# Patient Record
Sex: FEMALE | Race: WHITE | HISPANIC OR LATINO | Employment: UNEMPLOYED | URBAN - METROPOLITAN AREA
[De-identification: names, ages, dates, MRNs, and addresses within clinical notes are randomized per-mention and may not be internally consistent; named-entity substitution may affect disease eponyms.]

---

## 2017-12-10 ENCOUNTER — HOSPITAL ENCOUNTER (EMERGENCY)
Facility: HOSPITAL | Age: 22
Discharge: HOME | End: 2017-12-11
Attending: EMERGENCY MEDICINE
Payer: MEDICAID

## 2017-12-10 DIAGNOSIS — O20.9 VAGINAL BLEEDING IN PREGNANCY, FIRST TRIMESTER: ICD-10-CM

## 2017-12-10 DIAGNOSIS — O20.0 THREATENED ABORTION: Primary | ICD-10-CM

## 2017-12-10 LAB
B-HCG UR QL: POSITIVE
CTP QC/QA: YES

## 2017-12-10 PROCEDURE — 99284 EMERGENCY DEPT VISIT MOD MDM: CPT | Mod: 25

## 2017-12-10 PROCEDURE — 81000 URINALYSIS NONAUTO W/SCOPE: CPT

## 2017-12-10 PROCEDURE — 81025 URINE PREGNANCY TEST: CPT | Performed by: EMERGENCY MEDICINE

## 2017-12-11 VITALS
BODY MASS INDEX: 24.54 KG/M2 | HEART RATE: 77 BPM | DIASTOLIC BLOOD PRESSURE: 67 MMHG | HEIGHT: 60 IN | SYSTOLIC BLOOD PRESSURE: 104 MMHG | TEMPERATURE: 98 F | WEIGHT: 125 LBS | RESPIRATION RATE: 18 BRPM | OXYGEN SATURATION: 97 %

## 2017-12-11 LAB
ABO + RH BLD: NORMAL
ALBUMIN SERPL BCP-MCNC: 3.9 G/DL
ALP SERPL-CCNC: 66 U/L
ALT SERPL W/O P-5'-P-CCNC: 24 U/L
ANION GAP SERPL CALC-SCNC: 10 MMOL/L
AST SERPL-CCNC: 25 U/L
BACTERIA #/AREA URNS HPF: ABNORMAL /HPF
BACTERIA GENITAL QL WET PREP: ABNORMAL
BASOPHILS # BLD AUTO: 0.03 K/UL
BASOPHILS NFR BLD: 0.3 %
BILIRUB SERPL-MCNC: 0.7 MG/DL
BILIRUB UR QL STRIP: NEGATIVE
BUN SERPL-MCNC: 9 MG/DL
C TRACH DNA SPEC QL NAA+PROBE: NOT DETECTED
CALCIUM SERPL-MCNC: 9.3 MG/DL
CHLORIDE SERPL-SCNC: 104 MMOL/L
CLARITY UR: ABNORMAL
CLUE CELLS VAG QL WET PREP: ABNORMAL
CO2 SERPL-SCNC: 21 MMOL/L
COLOR UR: YELLOW
CREAT SERPL-MCNC: 0.7 MG/DL
DIFFERENTIAL METHOD: ABNORMAL
EOSINOPHIL # BLD AUTO: 0.3 K/UL
EOSINOPHIL NFR BLD: 2.7 %
ERYTHROCYTE [DISTWIDTH] IN BLOOD BY AUTOMATED COUNT: 11.9 %
EST. GFR  (AFRICAN AMERICAN): >60 ML/MIN/1.73 M^2
EST. GFR  (NON AFRICAN AMERICAN): >60 ML/MIN/1.73 M^2
FILAMENT FUNGI VAG WET PREP-#/AREA: ABNORMAL
GLUCOSE SERPL-MCNC: 88 MG/DL
GLUCOSE UR QL STRIP: NEGATIVE
HCG INTACT+B SERPL-ACNC: NORMAL MIU/ML
HCT VFR BLD AUTO: 36.9 %
HGB BLD-MCNC: 12.8 G/DL
HGB UR QL STRIP: ABNORMAL
KETONES UR QL STRIP: NEGATIVE
LEUKOCYTE ESTERASE UR QL STRIP: NEGATIVE
LYMPHOCYTES # BLD AUTO: 4.2 K/UL
LYMPHOCYTES NFR BLD: 40.6 %
MCH RBC QN AUTO: 30.9 PG
MCHC RBC AUTO-ENTMCNC: 34.7 G/DL
MCV RBC AUTO: 89 FL
MICROSCOPIC COMMENT: ABNORMAL
MONOCYTES # BLD AUTO: 0.6 K/UL
MONOCYTES NFR BLD: 6.1 %
N GONORRHOEA DNA SPEC QL NAA+PROBE: NOT DETECTED
NEUTROPHILS # BLD AUTO: 5.1 K/UL
NEUTROPHILS NFR BLD: 50 %
NITRITE UR QL STRIP: NEGATIVE
PH UR STRIP: 6 [PH] (ref 5–8)
PLATELET # BLD AUTO: 271 K/UL
PMV BLD AUTO: 10.4 FL
POTASSIUM SERPL-SCNC: 3.8 MMOL/L
PROT SERPL-MCNC: 7.9 G/DL
PROT UR QL STRIP: NEGATIVE
RBC # BLD AUTO: 4.14 M/UL
RBC #/AREA URNS HPF: >100 /HPF (ref 0–4)
SODIUM SERPL-SCNC: 135 MMOL/L
SP GR UR STRIP: 1.02 (ref 1–1.03)
SPECIMEN SOURCE: ABNORMAL
SQUAMOUS #/AREA URNS HPF: 5 /HPF
T VAGINALIS GENITAL QL WET PREP: ABNORMAL
URN SPEC COLLECT METH UR: ABNORMAL
UROBILINOGEN UR STRIP-ACNC: NEGATIVE EU/DL
WBC # BLD AUTO: 10.25 K/UL
WBC #/AREA URNS HPF: 2 /HPF (ref 0–5)
WBC #/AREA VAG WET PREP: ABNORMAL
YEAST GENITAL QL WET PREP: ABNORMAL

## 2017-12-11 PROCEDURE — 87210 SMEAR WET MOUNT SALINE/INK: CPT

## 2017-12-11 PROCEDURE — 86901 BLOOD TYPING SEROLOGIC RH(D): CPT

## 2017-12-11 PROCEDURE — 84702 CHORIONIC GONADOTROPIN TEST: CPT

## 2017-12-11 PROCEDURE — 80053 COMPREHEN METABOLIC PANEL: CPT

## 2017-12-11 PROCEDURE — 87591 N.GONORRHOEAE DNA AMP PROB: CPT

## 2017-12-11 PROCEDURE — 86900 BLOOD TYPING SEROLOGIC ABO: CPT

## 2017-12-11 PROCEDURE — 85025 COMPLETE CBC W/AUTO DIFF WBC: CPT

## 2017-12-11 NOTE — ED PROVIDER NOTES
"Encounter Date: 12/10/2017    SCRIBE #1 NOTE: I, Jerome Olivera, am scribing for, and in the presence of,  Peter Fernandez NP. I have scribed the following portions of the note - Other sections scribed: HPI/ROS.       History     Chief Complaint   Patient presents with    Female  Problem     pregnant with lower abd pain x 2 days, painful urination, "slimy" discharge and vag bleeding (dark red x 3 pads) which started today; denies fever; LMP 17; 11 weeks 4 days per wheel; GIUSEPPE 18; hasn't seen a doctor for the pregnancy;      CC: Female  Problem    HPI: This 22 y.o. Female who is 12 weeks pregnant with no pertinent medical history presents to the ED for an evaluation of acute onset, moderate (4/10) vaginal bleeding for the past 2 days. Patient also reports lower back pain that radiates to suprapubic region of abdomen for the past 3 days. She has not seen an OB-GYN for pregnancy yet. No alleviating or exacerbating factors. No prior tx. Otherwise, patient denies fever, chills, chest pain, SOB, cough, and N/V/D.      The history is provided by the patient. A  was used.     Review of patient's allergies indicates:  No Known Allergies  History reviewed. No pertinent past medical history.  Past Surgical History:   Procedure Laterality Date    CYST REMOVAL      forehead     History reviewed. No pertinent family history.  Social History   Substance Use Topics    Smoking status: Never Smoker    Smokeless tobacco: Never Used    Alcohol use No     Review of Systems   Constitutional: Negative for chills and fever.   HENT: Negative for congestion, ear pain, rhinorrhea and sore throat.    Eyes: Negative for pain and visual disturbance.   Respiratory: Negative for cough and shortness of breath.    Cardiovascular: Negative for chest pain.   Gastrointestinal: Positive for abdominal pain (suprapubic). Negative for diarrhea, nausea and vomiting.   Genitourinary: Positive for vaginal bleeding. " Negative for dysuria.   Musculoskeletal: Positive for back pain. Negative for neck pain.   Skin: Negative for rash.   Neurological: Negative for headaches.       Physical Exam     Initial Vitals [12/10/17 2213]   BP Pulse Resp Temp SpO2   125/70 86 14 98.7 °F (37.1 °C) 99 %      MAP       88.33         Physical Exam    Nursing note and vitals reviewed.  Constitutional: She appears well-developed and well-nourished. She is not diaphoretic. No distress.   HENT:   Head: Normocephalic and atraumatic.   Right Ear: External ear normal.   Left Ear: External ear normal.   Nose: Nose normal.   Eyes: Conjunctivae and EOM are normal. Pupils are equal, round, and reactive to light. Right eye exhibits no discharge. Left eye exhibits no discharge. No scleral icterus.   Neck: Normal range of motion. Neck supple. No thyromegaly present. No tracheal deviation present. No JVD present.   Cardiovascular: Normal rate, regular rhythm, normal heart sounds and intact distal pulses. Exam reveals no gallop and no friction rub.    No murmur heard.  Pulmonary/Chest: Breath sounds normal. No stridor. No respiratory distress. She has no wheezes. She has no rhonchi. She has no rales.   Abdominal: Soft. Bowel sounds are normal. She exhibits no distension and no mass. There is no tenderness. There is no rebound and no guarding.   Genitourinary: Pelvic exam was performed with patient supine. Uterus is enlarged (gravid). There is bleeding in the vagina.   Genitourinary Comments: There is blood in the vaginal vault. No tissue, proximal of conception, or foreign bodies. Cervical os is closed. The uterus is gravid.   Musculoskeletal: Normal range of motion. She exhibits no edema or tenderness.   Lymphadenopathy:     She has no cervical adenopathy.   Neurological: She is alert and oriented to person, place, and time. She has normal strength and normal reflexes. She displays normal reflexes. No cranial nerve deficit or sensory deficit.   Skin: Skin is  warm and dry. No rash and no abscess noted. No erythema. No pallor.   Psychiatric: She has a normal mood and affect. Her behavior is normal. Judgment and thought content normal.         ED Course   Procedures  Labs Reviewed   URINALYSIS - Abnormal; Notable for the following:        Result Value    Appearance, UA Hazy (*)     Occult Blood UA 3+ (*)     All other components within normal limits   CBC W/ AUTO DIFFERENTIAL - Abnormal; Notable for the following:     Hematocrit 36.9 (*)     All other components within normal limits   COMPREHENSIVE METABOLIC PANEL - Abnormal; Notable for the following:     Sodium 135 (*)     CO2 21 (*)     All other components within normal limits   VAGINAL SCREEN - Abnormal; Notable for the following:     WBC - Vaginal Screen Rare (*)     Bacteria - Vaginal Screen Occasional (*)     All other components within normal limits   URINALYSIS MICROSCOPIC - Abnormal; Notable for the following:     RBC, UA >100 (*)     Bacteria, UA Few (*)     All other components within normal limits   POCT URINE PREGNANCY - Abnormal; Notable for the following:     POC Preg Test, Ur Positive (*)     All other components within normal limits   C. TRACHOMATIS/N. GONORRHOEAE BY AMP DNA   HCG, QUANTITATIVE, PREGNANCY   GROUP & RH             Medical Decision Making:   ED Management:  22-year-old female presenting for evaluation of vaginal bleeding and suprapubic pain that began 2 days ago and has been continuous. Patient states she is 3 months pregnant. LMP 9/20/17. Patient is 11 weeks and 4 days by dates. Patient has had no prenatal care and no ultrasound confirmation of IUP. UPT is positive. Labs are unremarkable. Blood type is A+. HCG is 10,505. Ultrasound shows pregnancy of unknown viability. There is intrauterine gestational sac with possible fetal pole course find a 6 weeks and 2 days gestation. This does not correspond to the patient's dates. No fetal heart beat is detected. Findings could represent  spontaneous  or incorrect date of conception. Instructed patient to follow-up with OB for serial hCGs and repeat ultrasound. ED return precautions given. Patient verbalized understanding of diagnosis and discharge instructions.  Other:   I have discussed this case with another health care provider.       <> Summary of the Discussion: Case discussed with my attending physician Steven Marie M.D. who agreed with the assessment and plan.            Scribe Attestation:   Scribe #1: I performed the above scribed service and the documentation accurately describes the services I performed. I attest to the accuracy of the note.    Attending Attestation:           Physician Attestation for Scribe:  Physician Attestation Statement for Scribe #1: I, Peter Fernandez NP, reviewed documentation, as scribed by Jerome Olivera in my presence, and it is both accurate and complete.                 ED Course      Clinical Impression:   The primary encounter diagnosis was Threatened . A diagnosis of Vaginal bleeding in pregnancy, first trimester was also pertinent to this visit.    Disposition:   Disposition: Discharged  Condition: Stable                        Peter Fernandez NP  17 [id number]

## 2017-12-11 NOTE — ED TRIAGE NOTES
Pt is 11 weeks gestation and c/o lower back pain radiating to the lower abd for 2 days and having vaginal bleeding x 1 day.  Denies nausea and vomiting.

## 2017-12-11 NOTE — DISCHARGE INSTRUCTIONS
Wilfrid un seguimiento con un obstetra la próxima semana. Puede ir a destiny de los que se proporcionan en esta documentación o cualquiera que conozca.  Regrese al departamento de emergencias por cualquier síntoma nuevo o que empeore.      Follow-up with an obstetrician in the next week. You may go to one of the ones provided on this paperwork or any one you know of.  Return to the emergency department for any new or worsening symptoms.

## 2017-12-12 ENCOUNTER — HOSPITAL ENCOUNTER (EMERGENCY)
Facility: HOSPITAL | Age: 22
Discharge: HOME | End: 2017-12-12
Attending: EMERGENCY MEDICINE
Payer: MEDICAID

## 2017-12-12 VITALS
HEART RATE: 100 BPM | DIASTOLIC BLOOD PRESSURE: 78 MMHG | SYSTOLIC BLOOD PRESSURE: 110 MMHG | TEMPERATURE: 99 F | HEIGHT: 65 IN | RESPIRATION RATE: 16 BRPM | WEIGHT: 125 LBS | OXYGEN SATURATION: 99 % | BODY MASS INDEX: 20.83 KG/M2

## 2017-12-12 DIAGNOSIS — R10.30 LOWER ABDOMINAL PAIN: ICD-10-CM

## 2017-12-12 DIAGNOSIS — N93.9 VAGINAL BLEEDING: ICD-10-CM

## 2017-12-12 DIAGNOSIS — O03.9 MISCARRIAGE: Primary | ICD-10-CM

## 2017-12-12 LAB
ALBUMIN SERPL BCP-MCNC: 4 G/DL
ALP SERPL-CCNC: 74 U/L
ALT SERPL W/O P-5'-P-CCNC: 28 U/L
ANION GAP SERPL CALC-SCNC: 10 MMOL/L
AST SERPL-CCNC: 28 U/L
BASOPHILS # BLD AUTO: 0.04 K/UL
BASOPHILS NFR BLD: 0.5 %
BILIRUB SERPL-MCNC: 1 MG/DL
BILIRUB UR QL STRIP: NEGATIVE
BUN SERPL-MCNC: 7 MG/DL
CALCIUM SERPL-MCNC: 9.5 MG/DL
CHLORIDE SERPL-SCNC: 107 MMOL/L
CLARITY UR: ABNORMAL
CO2 SERPL-SCNC: 21 MMOL/L
COLOR UR: YELLOW
CREAT SERPL-MCNC: 0.7 MG/DL
DIFFERENTIAL METHOD: NORMAL
EOSINOPHIL # BLD AUTO: 0.2 K/UL
EOSINOPHIL NFR BLD: 1.8 %
ERYTHROCYTE [DISTWIDTH] IN BLOOD BY AUTOMATED COUNT: 11.9 %
EST. GFR  (AFRICAN AMERICAN): >60 ML/MIN/1.73 M^2
EST. GFR  (NON AFRICAN AMERICAN): >60 ML/MIN/1.73 M^2
GLUCOSE SERPL-MCNC: 93 MG/DL
GLUCOSE UR QL STRIP: NEGATIVE
HCG INTACT+B SERPL-ACNC: 5796 MIU/ML
HCT VFR BLD AUTO: 38.1 %
HGB BLD-MCNC: 13.3 G/DL
HGB UR QL STRIP: ABNORMAL
KETONES UR QL STRIP: NEGATIVE
LEUKOCYTE ESTERASE UR QL STRIP: ABNORMAL
LYMPHOCYTES # BLD AUTO: 2.1 K/UL
LYMPHOCYTES NFR BLD: 24.6 %
MCH RBC QN AUTO: 31 PG
MCHC RBC AUTO-ENTMCNC: 34.9 G/DL
MCV RBC AUTO: 89 FL
MICROSCOPIC COMMENT: ABNORMAL
MONOCYTES # BLD AUTO: 0.4 K/UL
MONOCYTES NFR BLD: 4.7 %
NEUTROPHILS # BLD AUTO: 5.8 K/UL
NEUTROPHILS NFR BLD: 68.3 %
NITRITE UR QL STRIP: NEGATIVE
PH UR STRIP: 7 [PH] (ref 5–8)
PLATELET # BLD AUTO: 260 K/UL
PMV BLD AUTO: 10.4 FL
POTASSIUM SERPL-SCNC: 4 MMOL/L
PROT SERPL-MCNC: 7.9 G/DL
PROT UR QL STRIP: NEGATIVE
RBC # BLD AUTO: 4.29 M/UL
RBC #/AREA URNS HPF: >100 /HPF (ref 0–4)
SODIUM SERPL-SCNC: 138 MMOL/L
SP GR UR STRIP: 1.01 (ref 1–1.03)
SQUAMOUS #/AREA URNS HPF: 1 /HPF
URN SPEC COLLECT METH UR: ABNORMAL
UROBILINOGEN UR STRIP-ACNC: NEGATIVE EU/DL
WBC # BLD AUTO: 8.52 K/UL
WBC #/AREA URNS HPF: 1 /HPF (ref 0–5)

## 2017-12-12 PROCEDURE — 80053 COMPREHEN METABOLIC PANEL: CPT

## 2017-12-12 PROCEDURE — 25000003 PHARM REV CODE 250: Performed by: NURSE PRACTITIONER

## 2017-12-12 PROCEDURE — 84702 CHORIONIC GONADOTROPIN TEST: CPT

## 2017-12-12 PROCEDURE — 81000 URINALYSIS NONAUTO W/SCOPE: CPT

## 2017-12-12 PROCEDURE — 96374 THER/PROPH/DIAG INJ IV PUSH: CPT

## 2017-12-12 PROCEDURE — 87086 URINE CULTURE/COLONY COUNT: CPT

## 2017-12-12 PROCEDURE — 96361 HYDRATE IV INFUSION ADD-ON: CPT

## 2017-12-12 PROCEDURE — 85025 COMPLETE CBC W/AUTO DIFF WBC: CPT

## 2017-12-12 PROCEDURE — 63600175 PHARM REV CODE 636 W HCPCS: Performed by: NURSE PRACTITIONER

## 2017-12-12 PROCEDURE — 99284 EMERGENCY DEPT VISIT MOD MDM: CPT | Mod: 25

## 2017-12-12 RX ORDER — ONDANSETRON 2 MG/ML
4 INJECTION INTRAMUSCULAR; INTRAVENOUS
Status: COMPLETED | OUTPATIENT
Start: 2017-12-12 | End: 2017-12-12

## 2017-12-12 RX ORDER — SODIUM CHLORIDE 9 MG/ML
1000 INJECTION, SOLUTION INTRAVENOUS
Status: COMPLETED | OUTPATIENT
Start: 2017-12-12 | End: 2017-12-12

## 2017-12-12 RX ORDER — ACETAMINOPHEN 325 MG/1
650 TABLET ORAL
Status: COMPLETED | OUTPATIENT
Start: 2017-12-12 | End: 2017-12-12

## 2017-12-12 RX ADMIN — ONDANSETRON 4 MG: 2 INJECTION INTRAMUSCULAR; INTRAVENOUS at 08:12

## 2017-12-12 RX ADMIN — SODIUM CHLORIDE 1000 ML: 0.9 INJECTION, SOLUTION INTRAVENOUS at 08:12

## 2017-12-12 RX ADMIN — ACETAMINOPHEN 650 MG: 325 TABLET ORAL at 09:12

## 2017-12-12 NOTE — ED NOTES
Patient bleeding through pants, given pads, diaper and blue scrubs.  Informed would be first to go back when fast track opened. Friend with patient assisting to change.

## 2017-12-12 NOTE — ED PROVIDER NOTES
"Encounter Date: 2017    SCRIBE #1 NOTE: I, RenettaShayMacrina Maurice, am scribing for, and in the presence of,  Ethan Day NP. I have scribed the following portions of the note - Other sections scribed: HPI, JUAN.       History     Chief Complaint   Patient presents with    Female  Problem     3 1/2 months pregnant and began bleeding during the night again. this morning passed large blood clot.  Originally began bleeding this past saturday, seen here on  for same. CO pelvic pain.      CC: Female  Problem    21 y/o 15 wks gravid female (A0) presents to the ED c/o 5 day hx of acute onset suprapubic abdominal pain, lumbar back pain, vaginal bleeding, and nausea. The pain is severe (10/10). The patient states "I'm having a lot of contractions." The patient presented to this facility 2 days ago for similar symptoms. The patient's LMP was 17. The patient has not consulted with an OBGYN for her current pregnancy. The patient denies emesis, diarrhea, or fever. No other symptoms reported.      The history is provided by the patient. A  was used.     Review of patient's allergies indicates:  No Known Allergies  History reviewed. No pertinent past medical history.  Past Surgical History:   Procedure Laterality Date    CYST REMOVAL      forehead     History reviewed. No pertinent family history.  Social History   Substance Use Topics    Smoking status: Never Smoker    Smokeless tobacco: Never Used    Alcohol use No     Review of Systems   Constitutional: Negative for chills and fever.   HENT: Negative for rhinorrhea and trouble swallowing.    Respiratory: Negative for cough and shortness of breath.    Cardiovascular: Negative for chest pain.   Gastrointestinal: Positive for abdominal pain (suprapubic) and nausea. Negative for diarrhea and vomiting.   Genitourinary: Positive for vaginal bleeding. Negative for difficulty urinating and dysuria.   Musculoskeletal: Positive for back pain " (lumbar).   Skin: Negative for rash.   Neurological: Negative for headaches.   Psychiatric/Behavioral: Negative for confusion.       Physical Exam     Initial Vitals [12/12/17 0552]   BP Pulse Resp Temp SpO2   120/70 91 18 98 °F (36.7 °C) 99 %      MAP       86.67         Physical Exam    Nursing note and vitals reviewed.  Constitutional: She appears well-developed and well-nourished. She is not diaphoretic. She is cooperative.  Non-toxic appearance. No distress.   HENT:   Head: Normocephalic and atraumatic.   Right Ear: External ear normal.   Left Ear: External ear normal.   Eyes: Conjunctivae and EOM are normal.   Neck: Normal range of motion. No tracheal deviation present.   Cardiovascular: Normal rate, regular rhythm and normal heart sounds. Exam reveals no gallop and no friction rub.    No murmur heard.  Pulmonary/Chest: Effort normal and breath sounds normal. No stridor. No tachypnea and no bradypnea. No respiratory distress. She has no wheezes. She has no rhonchi. She has no rales. She exhibits no tenderness.   Abdominal: Soft. Bowel sounds are normal. She exhibits no distension and no mass. There is tenderness in the suprapubic area. There is no rebound and no guarding.   Genitourinary: Pelvic exam was performed with patient supine. There is no rash, tenderness or lesion on the right labia. There is no rash, tenderness or lesion on the left labia. Cervix exhibits discharge (blood). Cervix exhibits no motion tenderness and no friability. Right adnexum displays no mass, no tenderness and no fullness. Left adnexum displays no mass, no tenderness and no fullness. There is bleeding in the vagina. No erythema in the vagina.   Genitourinary Comments: Exam chaperoned by: CURTIS Beckwith.  Cervical os closed. Moderate amount of blood noted in the vaginal vault with no tissue or clots identified. Blood noted coming from the cervix.    Neurological: She is alert and oriented to person, place, and time. She has normal strength.  No sensory deficit. Coordination normal. GCS eye subscore is 4. GCS verbal subscore is 5. GCS motor subscore is 6.   Skin: Skin is warm, dry and intact. Capillary refill takes less than 2 seconds. No bruising and no rash noted. No cyanosis or erythema. Nails show no clubbing.   Psychiatric: She has a normal mood and affect. Her speech is normal and behavior is normal. Thought content normal.         ED Course   Procedures  Labs Reviewed   COMPREHENSIVE METABOLIC PANEL - Abnormal; Notable for the following:        Result Value    CO2 21 (*)     All other components within normal limits   URINALYSIS - Abnormal; Notable for the following:     Appearance, UA Hazy (*)     Occult Blood UA 3+ (*)     Leukocytes, UA Trace (*)     All other components within normal limits   URINALYSIS MICROSCOPIC - Abnormal; Notable for the following:     RBC, UA >100 (*)     All other components within normal limits   CULTURE, URINE   CBC W/ AUTO DIFFERENTIAL   HCG, QUANTITATIVE, PREGNANCY             Medical Decision Making:   History:   Old Medical Records: I decided to obtain old medical records.  Old Records Summarized: records from previous admission(s).       <> Summary of Records: Patient evaluated in this emergency department on 12/10/2017.  Diagnosis threatened AB in the first trimester with vaginal bleeding in pregnancy.  She is A+.  Her beta hCG at that visit was 10,505.  H&H 12.8 and 36.9.  Ultrasound report consistent with pregnancy been in viability.  There is an intrauterine gestational sac with fetal pole corresponding to 6 weeks 2 days with no fetal heart tones or yolk sac detected.   Clinical Tests:   Lab Tests: Ordered and Reviewed       APC / Resident Notes:   This is an evaluation of a 22 y.o. female that presents to the Emergency Department for low mid suprapubic abdominal pain, lower lumbar back pain, and vaginal bleeding.  She reports her LMP September 20, 2017.  This would put the patient at 12 weeks today.  She was  seen in this emergency department 2 days ago for vaginal bleeding in her normal ultrasound was performed.  Fetal cardiac activity was not identified.  No fetal yolk sac.  There was a gestational sac in the intrauterine cavity.  Findings were consistent with pregnancy been in viability.  She comes in today with worsening abdominal pain and cramping as well as back pain. Physical Exam shows a non-toxic, afebrile, and well appearing female.  Breath sounds are clear and equal.  Heart regular rhythm.  Abdomen soft with tenderness palpation in the low mid suprapubic abdomen.  There is no tenderness of the right or left lower quadrants.  No focal tenderness of the midline lumbar back.  Pelvic exam with blood noted in the vaginal vault.  There is no clots or tissues identified.  Cervical os is closed. Vital Signs Are Reassuring. If available, previous records reviewed.  RESULTS: CBC with no leukocytosis or anemia.  Normal platelet count.  No bandemia.  CMP with a CO2 of 21, otherwise unremarkable.  Beta hCG is 5796, that is down from 10,505 on 12/11/2017.    My overall impression is lower abdominal pain, miscarriage, vaginal bleeding.  I considered, but at this time, do not suspect bowel obstruction, bowel perforation, appendicitis, ectopic pregnancy, TOA, ovarian torsion, UTI.  At this time her beta hCG is decreasing appropriately, do not suspect retained products at this time.  She has been advised to follow-up with OB/GYN or return to the emergency Department for repeat testing or pain and bleeding persists.    ED Course: IV fluids, Tylenol.  Reassessment: Patient reports her abdominal pain has improved after the medication. D/C Information: Spontaneous AB return precautions and instructions, OB/GYN follow-up instructions. The diagnosis, treatment plan, instructions for follow-up and reevaluation with OB/GYN as well as ED return precautions were discussed and understanding was verbalized. All questions or concerns have  been addressed. This case was discussed with Dr. Barrera who is in agreement with my assessment and plan. JUAN Rojas, SHENA-C        Scribe Attestation:   Scribe #1: I performed the above scribed service and the documentation accurately describes the services I performed. I attest to the accuracy of the note.    Attending Attestation:     Physician Attestation Statement for NP/PA:   I discussed this assessment and plan of this patient with the NP/PA, but I did not personally examine the patient. The face to face encounter was performed by the NP/PA.        Physician Attestation for Scribe:  Physician Attestation Statement for Scribe #1: I, Ethan Black - NP, reviewed documentation, as scribed by Ana Richards in my presence, and it is both accurate and complete.                 ED Course      Clinical Impression:   The primary encounter diagnosis was Miscarriage. Diagnoses of Vaginal bleeding and Lower abdominal pain were also pertinent to this visit.    Disposition:   Disposition: Discharged  Condition: Stable                        SHENA Landers  12/12/17 1219       Patrice Barrera MD  12/13/17 4454

## 2017-12-12 NOTE — DISCHARGE INSTRUCTIONS
Regrese al Servicio de Urgencia por cualquier síntoma nuevo o que empeore, incluyendo: saturación de más de 1 compresa por hora, mareos, debilidad, fiebre, dolor en el pecho, dificultad para respirar, pérdida de conocimiento, mareos, debilidad o cualquier otra inquietud.    Por favor, keith un seguimiento con apple OB / GYN dentro de la semana para repetir apple análisis de jeanna y asegurarse de que apple aborto haya finalizado por completo. Si no tiene destiny, puede comunicarse con el que figura en apple documentación de iam o también puede llamar al [place] de citas de la [facility] al [phone] para programar nato homero con destiny.    Por favor mantente yamil hidratado. Beber mucho líquido. Tylenol según sea necesario para el dolor.  -----------------------------------  Please return to the Emergency Department for any new or worsening symptoms including: saturating more than 1 pad per hour, dizziness, weakness, fever, chest pain, shortness of breath, loss of consciousness, dizziness, weakness, or any other concerns.     Please follow up with your OB/GYN within in the week to repeat your blood work to make sure your miscarriage is completely finished. If you do not have one, you may contact the one listed on your discharge paperwork or you may also call the [facility] Appointment Desk at [phone] to schedule an appointment with one.     Please stay well hydrated. Drink Plenty of Fluids. Tylenol as needed for pain.       Entienda el aborto espontáneo: tiesha un aborto espontáneo  No hay dos abortos espontáneos que yancy iguales. Por eso, apple médico hablará con usted sobre lo que es mejor para apple recuperación. Si usted tiene buena belinda y esto sucedió al principio del embarazo, es posible que apple cuerpo expulse todo el tejido del embarazo por apple cuenta. Si apple cuerpo no lo hace, apple proveedor de atención médica puede recomendarle un tratamiento para evitar infecciones y sangrado grave (hemorragia).     Tiesha un  aborto espontáneo el cuerpo expulsa la mucosa del útero espesada o el saco gestacional.   Qué sucede tiesha un aborto espontáneo  La mayoría de los abortos espontáneos comienzan con sangrado. Con el tiempo, puede aumentar el flujo de jeanna y la cantidad de cólicos, que pueden volverse muy jerald. Salmon es normal en un aborto espontáneo. Los cólicos hacen que se ensanche la abertura (margarita uterino) del útero, que es por donde debe pasar fareed tejido que está ahora dentro del útero para salir del cuerpo. Es posible que apple proveedor de atención médica le pida nato muestra del tejido para hacerle análisis de laboratorio y poder confirmar que las células que están saliendo de apple cuerpo son normales.  Diagnóstico  Para confirmar que usted ha tenido un aborto espontáneo, apple proveedor de atención médica le hará un examen pélvico. Y podría solicitar un análisis de jeanna para medir los niveles de la hormona del embarazo (hCG).También podría indicarle que se keith nato ecografía, que sirve para comprobar si todo el tejido del embarazo ha salido de apple útero. Si el aborto espontáneo se presenta muy al comienzo del embarazo, no se necesita un ultrasonido.  Tratamiento  Si queda algo de tejido en apple útero, apple proveedor de atención médica puede sugerir las siguientes medidas, según apple situación particular:  · Medicamentos: Los recetará para que usted los tome en apple casa. Los medicamentos hacen que el útero expulse el tejido que pueda quedarle. [employer] los medicamentos exactamente mariel le indicaron.  · Dilatación y legrado (D y L): Trisha procedimiento se hace en el consultorio de apple médico o en el hospital. Le darán medicamentos para evitar el dolor o para permitirle que se relaje o duerma tiesha el procedimiento. El proveedor de atención médica usará instrumentos para expandir apple margarita uterino (esto es la dilatación). Le extraerá el tejido y la jeanna que recubre el útero (esto es el legrado).  Hable con apple proveedor de atención  médica sobre los riesgos y beneficios de estos tratamientos.  Si apple tipo de jeanna es RH negativo  Si apple tipo de jeanna es RH negativo, puede que necesite tratamiento con inmunoglobulina Rh0 [D]. Esta inyección lm que ciertas sustancias que hay en apple cuerpo ataquen la jeanna de apple bebé en un futuro embarazo. Apple proveedor de atención médica puede sugerirle recibir consejería u otros recursos para ayudarla.   Seguimiento  Acuda a todas las citas de seguimiento. Son necesarias para asegurar que usted se está recuperando yamil. Samantha estas visitas, dígale a apple proveedor de atención médica si se está sintiendo muy rose o deprimida. Apple proveedor de atención médica puede sugerirle sesiones de orientación (counseling) u otros recursos que puedan serle útiles.  Cuándo buscar atención médica  Comuníquese con apple proveedor de atención médica si nota alguno de los siguientes síntomas:  · Dolor muy mary ellen en el estómago, la pelvis o la parte baja de la espalda  · Secreción vaginal con olor desagradable  · Sangrado que empapa nato toallita sanitaria nueva cada hora  · Fiebre de 100.4 °F (38 °C) o más, o según le indique apple proveedor de atención médica   Date Last Reviewed: 6/1/2016  © 2986-4317 [employer]. [address] [place] [address], [place] [address]. Todos los derechos reservados. Esta información no pretende sustituir la atención médica profesional. Sólo apple médico puede diagnosticar y tratar un problema de belinda.

## 2017-12-12 NOTE — ED TRIAGE NOTES
"History provided through KATHERINE. Pt arrived to ER with complaints of " I am here because I have bleeding, abdominal pain and I am having contractions. It started on Friday and I am almost four month pregnant. I have not seen an ob for this pregnancy." Pt reports her last menstrual period was October. Pt reports nausea denies vomiting. Pt reports using 5 sanitary napkins since this morning. Pt describes the bleeding as " red yesterday night and then it became dark brown and now its red with frequent blood clots". Pt rates the pain as 9-10/10 at this time and describes it as " it comes every five minutes hard".   "

## 2017-12-14 LAB — BACTERIA UR CULT: NORMAL
